# Patient Record
Sex: MALE | Race: OTHER | HISPANIC OR LATINO | URBAN - METROPOLITAN AREA
[De-identification: names, ages, dates, MRNs, and addresses within clinical notes are randomized per-mention and may not be internally consistent; named-entity substitution may affect disease eponyms.]

---

## 2020-07-02 ENCOUNTER — OFFICE VISIT (OUTPATIENT)
Dept: URBAN - METROPOLITAN AREA CLINIC 60 | Facility: CLINIC | Age: 75
End: 2020-07-02

## 2020-07-21 ENCOUNTER — OFFICE VISIT (OUTPATIENT)
Dept: URBAN - METROPOLITAN AREA CLINIC 60 | Facility: CLINIC | Age: 75
End: 2020-07-21

## 2020-08-12 ENCOUNTER — OFFICE VISIT (OUTPATIENT)
Dept: URBAN - METROPOLITAN AREA SURGERY CENTER 4 | Facility: SURGERY CENTER | Age: 75
End: 2020-08-12

## 2020-09-23 ENCOUNTER — OFFICE VISIT (OUTPATIENT)
Dept: URBAN - METROPOLITAN AREA SURGERY CENTER 4 | Facility: SURGERY CENTER | Age: 75
End: 2020-09-23

## 2020-11-16 ENCOUNTER — OFFICE VISIT (OUTPATIENT)
Dept: URBAN - METROPOLITAN AREA SURGERY CENTER 4 | Facility: SURGERY CENTER | Age: 75
End: 2020-11-16

## 2021-01-20 ENCOUNTER — OFFICE VISIT (OUTPATIENT)
Dept: URBAN - METROPOLITAN AREA SURGERY CENTER 4 | Facility: SURGERY CENTER | Age: 76
End: 2021-01-20

## 2021-06-15 ENCOUNTER — OFFICE VISIT (OUTPATIENT)
Dept: URBAN - METROPOLITAN AREA CLINIC 60 | Facility: CLINIC | Age: 76
End: 2021-06-15

## 2021-07-13 ENCOUNTER — OFFICE VISIT (OUTPATIENT)
Dept: URBAN - METROPOLITAN AREA CLINIC 60 | Facility: CLINIC | Age: 76
End: 2021-07-13

## 2021-08-17 ENCOUNTER — OFFICE VISIT (OUTPATIENT)
Dept: URBAN - METROPOLITAN AREA CLINIC 60 | Facility: CLINIC | Age: 76
End: 2021-08-17

## 2021-09-07 ENCOUNTER — OFFICE VISIT (OUTPATIENT)
Dept: URBAN - METROPOLITAN AREA CLINIC 60 | Facility: CLINIC | Age: 76
End: 2021-09-07

## 2022-07-09 ENCOUNTER — TELEPHONE ENCOUNTER (OUTPATIENT)
Dept: URBAN - METROPOLITAN AREA CLINIC 121 | Facility: CLINIC | Age: 77
End: 2022-07-09

## 2022-07-09 RX ORDER — BRIMONIDINE TARTRATE, TIMOLOL MALEATE 2; 5 MG/ML; MG/ML
SOLUTION/ DROPS OPHTHALMIC TWICE A DAY
Refills: 0 | OUTPATIENT
Start: 2014-10-28 | End: 2020-04-13

## 2022-07-09 RX ORDER — BRINZOLAMIDE 10 MG/ML
SUSPENSION/ DROPS OPHTHALMIC
Refills: 0 | OUTPATIENT
Start: 2020-06-12 | End: 2020-07-02

## 2022-07-09 RX ORDER — SOLIFENACIN SUCCINATE 5 MG/1
TABLET, FILM COATED ORAL ONCE A DAY
Refills: 0 | OUTPATIENT
Start: 2014-10-28 | End: 2020-07-02

## 2022-07-09 RX ORDER — OMEPRAZOLE 20 MG/1
CAPSULE, DELAYED RELEASE ORAL
Refills: 0 | OUTPATIENT
Start: 2020-02-06 | End: 2020-04-13

## 2022-07-09 RX ORDER — SIMVASTATIN 10 MG/1
TABLET, FILM COATED ORAL
Refills: 0 | OUTPATIENT
Start: 2020-07-02 | End: 2020-07-02

## 2022-07-09 RX ORDER — FINASTERIDE 5 MG/1
TABLET, FILM COATED ORAL ONCE A DAY
Refills: 0 | OUTPATIENT
Start: 2014-10-28 | End: 2020-07-02

## 2022-07-09 RX ORDER — BRINZOLAMIDE 10 MG/ML
SUSPENSION/ DROPS OPHTHALMIC TWICE A DAY
Refills: 0 | OUTPATIENT
Start: 2014-10-28 | End: 2020-04-13

## 2022-07-09 RX ORDER — LATANOPROST/PF 0.005 %
DROPS OPHTHALMIC (EYE)
Refills: 0 | OUTPATIENT
Start: 2020-07-02 | End: 2020-07-02

## 2022-07-09 RX ORDER — LATANOPROST/PF 0.005 %
DROPS OPHTHALMIC (EYE) TAKE AS DIRECTED
Refills: 0 | OUTPATIENT
Start: 2020-01-16 | End: 2020-04-13

## 2022-07-09 RX ORDER — LATANOPROST/PF 0.005 %
DROPS OPHTHALMIC (EYE) TAKE AS DIRECTED
Refills: 0 | OUTPATIENT
Start: 2020-04-13 | End: 2020-07-02

## 2022-07-09 RX ORDER — BRIMONIDINE TARTRATE, TIMOLOL MALEATE 2; 5 MG/ML; MG/ML
SOLUTION/ DROPS OPHTHALMIC TWICE A DAY
Refills: 0 | OUTPATIENT
Start: 2020-04-13 | End: 2020-07-02

## 2022-07-09 RX ORDER — OMEPRAZOLE 20 MG/1
CAPSULE, DELAYED RELEASE ORAL
Refills: 0 | OUTPATIENT
Start: 2020-07-02 | End: 2020-07-02

## 2022-07-09 RX ORDER — TAMSULOSIN HYDROCHLORIDE 0.4 MG/1
CAPSULE ORAL ONCE A DAY
Refills: 0 | OUTPATIENT
Start: 2014-10-28 | End: 2020-07-02

## 2022-07-09 RX ORDER — TAMSULOSIN HYDROCHLORIDE 0.4 MG/1
CAPSULE ORAL ONCE A DAY
Refills: 0 | OUTPATIENT
Start: 2020-07-02 | End: 2020-07-02

## 2022-07-09 RX ORDER — SIMVASTATIN 10 MG/1
TABLET, FILM COATED ORAL
Refills: 0 | OUTPATIENT
Start: 2020-06-01 | End: 2020-07-02

## 2022-07-09 RX ORDER — OMEPRAZOLE 20 MG/1
CAPSULE, DELAYED RELEASE ORAL
Refills: 0 | OUTPATIENT
Start: 2020-04-13 | End: 2020-07-02

## 2022-07-09 RX ORDER — LATANOPROST 0.005 %
DROPS OPHTHALMIC (EYE)
Refills: 0 | OUTPATIENT
Start: 2010-02-12 | End: 2020-04-13

## 2022-07-09 RX ORDER — SOLIFENACIN SUCCINATE 5 MG/1
TABLET, FILM COATED ORAL ONCE A DAY
Refills: 0 | OUTPATIENT
Start: 2020-07-02 | End: 2020-07-02

## 2022-07-09 RX ORDER — METRONIDAZOLE 500 MG/1
TWICE A DAY TABLET ORAL TWICE A DAY
Refills: 0 | OUTPATIENT
Start: 2015-06-05 | End: 2020-04-13

## 2022-07-09 RX ORDER — LATANOPROST/PF 0.005 %
DROPS OPHTHALMIC (EYE)
Refills: 0 | OUTPATIENT
Start: 2020-06-10 | End: 2020-07-02

## 2022-07-10 ENCOUNTER — TELEPHONE ENCOUNTER (OUTPATIENT)
Dept: URBAN - METROPOLITAN AREA CLINIC 121 | Facility: CLINIC | Age: 77
End: 2022-07-10

## 2022-07-10 RX ORDER — FINASTERIDE 5 MG/1
TABLET, FILM COATED ORAL ONCE A DAY
Refills: 0 | Status: ACTIVE | COMMUNITY
Start: 2020-07-02

## 2022-07-10 RX ORDER — LATANOPROST/PF 0.005 %
DROPS OPHTHALMIC (EYE) TAKE AS DIRECTED
Refills: 0 | Status: ACTIVE | COMMUNITY
Start: 2020-07-02

## 2022-07-10 RX ORDER — BRIMONIDINE TARTRATE, TIMOLOL MALEATE 2; 5 MG/ML; MG/ML
SOLUTION/ DROPS OPHTHALMIC TWICE A DAY
Refills: 0 | Status: ACTIVE | COMMUNITY
Start: 2020-07-02

## 2022-07-10 RX ORDER — OMEPRAZOLE 20 MG/1
CAPSULE, DELAYED RELEASE ORAL ONCE A DAY
Refills: 0 | Status: ACTIVE | COMMUNITY
Start: 2020-07-02

## 2022-07-10 RX ORDER — SIMVASTATIN 10 MG/1
TABLET, FILM COATED ORAL ONCE A DAY
Refills: 0 | Status: ACTIVE | COMMUNITY
Start: 2020-07-02

## 2022-07-10 RX ORDER — BRINZOLAMIDE 10 MG/ML
SUSPENSION/ DROPS OPHTHALMIC
Refills: 0 | Status: ACTIVE | COMMUNITY
Start: 2020-07-02

## 2022-07-30 ENCOUNTER — TELEPHONE ENCOUNTER (OUTPATIENT)
Age: 77
End: 2022-07-30

## 2022-07-31 ENCOUNTER — TELEPHONE ENCOUNTER (OUTPATIENT)
Age: 77
End: 2022-07-31

## 2022-09-13 ENCOUNTER — OFFICE VISIT (OUTPATIENT)
Dept: URBAN - METROPOLITAN AREA CLINIC 60 | Facility: CLINIC | Age: 77
End: 2022-09-13

## 2022-09-13 RX ORDER — BRINZOLAMIDE 10 MG/ML
SUSPENSION/ DROPS OPHTHALMIC
Refills: 0 | COMMUNITY
Start: 2020-07-02

## 2022-09-13 RX ORDER — SIMVASTATIN 10 MG/1
TABLET, FILM COATED ORAL ONCE A DAY
Refills: 0 | COMMUNITY
Start: 2020-07-02

## 2022-09-13 RX ORDER — OMEPRAZOLE 20 MG/1
CAPSULE, DELAYED RELEASE ORAL ONCE A DAY
Refills: 0 | COMMUNITY
Start: 2020-07-02

## 2022-09-13 RX ORDER — BRIMONIDINE TARTRATE, TIMOLOL MALEATE 2; 5 MG/ML; MG/ML
SOLUTION/ DROPS OPHTHALMIC TWICE A DAY
Refills: 0 | COMMUNITY
Start: 2020-07-02

## 2022-09-13 RX ORDER — LATANOPROST/PF 0.005 %
DROPS OPHTHALMIC (EYE) TAKE AS DIRECTED
Refills: 0 | COMMUNITY
Start: 2020-07-02

## 2022-09-13 RX ORDER — FINASTERIDE 5 MG/1
TABLET, FILM COATED ORAL ONCE A DAY
Refills: 0 | COMMUNITY
Start: 2020-07-02

## 2022-10-25 ENCOUNTER — OFFICE VISIT (OUTPATIENT)
Dept: URBAN - METROPOLITAN AREA CLINIC 60 | Facility: CLINIC | Age: 77
End: 2022-10-25

## 2023-11-07 ENCOUNTER — OFFICE VISIT (OUTPATIENT)
Dept: URBAN - METROPOLITAN AREA CLINIC 60 | Facility: CLINIC | Age: 78
End: 2023-11-07

## 2023-11-07 PROBLEM — 305058001: Status: ACTIVE | Noted: 2023-11-07

## 2023-11-07 PROBLEM — 266435005: Status: ACTIVE | Noted: 2023-11-07

## 2023-11-07 RX ORDER — BRINZOLAMIDE 10 MG/ML
SUSPENSION/ DROPS OPHTHALMIC
Refills: 0 | COMMUNITY
Start: 2020-07-02

## 2023-11-07 RX ORDER — LATANOPROST/PF 0.005 %
DROPS OPHTHALMIC (EYE) TAKE AS DIRECTED
Refills: 0 | COMMUNITY
Start: 2020-07-02

## 2023-11-07 RX ORDER — FINASTERIDE 5 MG/1
TABLET, FILM COATED ORAL ONCE A DAY
Refills: 0 | COMMUNITY
Start: 2020-07-02

## 2023-11-07 RX ORDER — SIMVASTATIN 10 MG/1
TABLET, FILM COATED ORAL ONCE A DAY
Refills: 0 | COMMUNITY
Start: 2020-07-02

## 2023-11-07 RX ORDER — OMEPRAZOLE 20 MG/1
CAPSULE, DELAYED RELEASE ORAL ONCE A DAY
Refills: 0 | COMMUNITY
Start: 2020-07-02

## 2023-11-07 RX ORDER — BRIMONIDINE TARTRATE, TIMOLOL MALEATE 2; 5 MG/ML; MG/ML
SOLUTION/ DROPS OPHTHALMIC TWICE A DAY
Refills: 0 | COMMUNITY
Start: 2020-07-02

## 2023-11-07 NOTE — HPI-TODAY'S VISIT:
Garrett is a pleasant 78-year-old male who was last seen in July 2020 for symptoms of gas and bloating and was set up to undergo an upper endoscopy and colonoscopy. Procedures were canceled at the time due to COVID. He was lost to follow-up. Previous history of H. pylori gastritis.

## 2023-11-07 NOTE — HPI-PREVIOUS LABS
Labs August 2023: WBC 5.5, hemoglobin 14.4 MCV 89, platelets 225 BUN 21 creatinine 0.97, normal electrolytes, serum albumin 4.1 normal liver enzymes, UA proteinuria and trace occult blood.

## 2024-02-06 ENCOUNTER — OV EP (OUTPATIENT)
Dept: URBAN - METROPOLITAN AREA CLINIC 60 | Facility: CLINIC | Age: 79
End: 2024-02-06
Payer: COMMERCIAL

## 2024-02-06 VITALS
HEART RATE: 61 BPM | TEMPERATURE: 97.9 F | OXYGEN SATURATION: 96 % | HEIGHT: 65 IN | DIASTOLIC BLOOD PRESSURE: 70 MMHG | RESPIRATION RATE: 12 BRPM | BODY MASS INDEX: 20.29 KG/M2 | WEIGHT: 121.8 LBS | SYSTOLIC BLOOD PRESSURE: 124 MMHG

## 2024-02-06 DIAGNOSIS — Z12.11 SCREEN FOR COLON CANCER: ICD-10-CM

## 2024-02-06 DIAGNOSIS — K21.9 GASTROESOPHAGEAL REFLUX DISEASE WITHOUT ESOPHAGITIS: ICD-10-CM

## 2024-02-06 PROCEDURE — 99203 OFFICE O/P NEW LOW 30 MIN: CPT | Performed by: INTERNAL MEDICINE

## 2024-02-06 RX ORDER — FINASTERIDE 5 MG/1
TABLET, FILM COATED ORAL ONCE A DAY
Refills: 0 | Status: ACTIVE | COMMUNITY
Start: 2020-07-02

## 2024-02-06 RX ORDER — OMEPRAZOLE 20 MG/1
CAPSULE, DELAYED RELEASE ORAL ONCE A DAY
Refills: 0 | Status: ACTIVE | COMMUNITY
Start: 2020-07-02

## 2024-02-06 RX ORDER — LATANOPROST/PF 0.005 %
DROPS OPHTHALMIC (EYE) TAKE AS DIRECTED
Refills: 0 | Status: ACTIVE | COMMUNITY
Start: 2020-07-02

## 2024-02-06 RX ORDER — BRIMONIDINE TARTRATE, TIMOLOL MALEATE 2; 5 MG/ML; MG/ML
SOLUTION/ DROPS OPHTHALMIC TWICE A DAY
Refills: 0 | Status: ACTIVE | COMMUNITY
Start: 2020-07-02

## 2024-02-06 NOTE — HPI-PREVIOUS LABS
Labs August 2023: WBC 5.5, hemoglobin 14.4 MCV 89, platelets 225 BUN 21 creatinine 0.97, normal electrolytes, serum albumin 4.1 normal liver enzymes, UA proteinuria and trace occult blood. Labs August 2023: WBC 5.5, hemoglobin 14.4 MCV 89, platelets 225 BUN 21 creatinine 0.97, normal electrolytes, serum albumin 4.1 normal liver enzymes, UA proteinuria and trace occult blood.

## 2024-02-06 NOTE — HPI-TODAY'S VISIT:
Garrett is a pleasant 78-year-old male who was last seen in July 2020 for symptoms of gas and bloating and was set up to undergo an upper endoscopy and colonoscopy. Procedures were canceled at the time due to COVID. He was lost to follow-up.  Here to schedule a screening colonoscopy. I reviewed his history. He had an upper endoscopy and a colonoscopy done by me in 2010 which was unremarkable. No H. pylori, no Montoya's, small hiatal hernia, no polyps but was noted to have diverticular disease Subsequently underwent upper endoscopy in 2015 done by me and an upper endoscopy and colonoscopy in 2017 done by Dr. Wayne Lau-all unremarkable Garrett reports mild symptoms of bloating and flatulence and admits that he has been drinking milk and eating ice cream on and off. He has now switched to soy milk. He denies any dysphagia, early satiety, loss of appetite, unintentional weight loss, rectal bleeding or recent change in bowel habits. He appears weak and frail and in my opinion may not tolerate a colonoscopy prep. The risks of colonoscopy endoscopy and sedation outweigh the benefits  Previous history of H. pylori gastritis.

## 2024-02-06 NOTE — HPI-PREVIOUS IMAGING
CT abdomen and pelvis November 2014 was unremarkable except for mild prostate enlargement CT abdomen and pelvis November 2014 was unremarkable except for mild prostate enlargement

## 2024-02-06 NOTE — HPI-PREVIOUS PROCEDURES
Colonoscopy 2017-Dr. Lau: Adequate bowel prep, sigmoid diverticulosis, grade 1 hemorrhoids-no polyps-recall in 10 years Upper endoscopy 2017: Irregular Z-line-biopsied-unremarkable biopsies, small hiatal hernia, mild erythematous gastritis-biopsied to be H. pylori negative  Upper endoscopy 2015: Mild gastritis, hiatal hernia, multiple duodenal diverticula  Upper endoscopy 2010: Mild H. pylori negative gastritis and a 3 cm hiatal hernia and multiple duodenal diverticula with unremarkable duodenal biopsies Colonoscopy 2010: Pancolonic mild diverticular disease and lipomatous appearing IC valve with unremarkable biopsies Colonoscopy 2017-Dr. Lau: Adequate bowel prep, sigmoid diverticulosis, grade 1 hemorrhoids-no polyps-recall in 10 years Upper endoscopy 2017: Irregular Z-line-biopsied-unremarkable biopsies, small hiatal hernia, mild erythematous gastritis-biopsied to be H. pylori negative  Upper endoscopy 2015: Mild gastritis, hiatal hernia, multiple duodenal diverticula  Upper endoscopy 2010: Mild H. pylori negative gastritis and a 3 cm hiatal hernia and multiple duodenal diverticula with unremarkable duodenal biopsies Colonoscopy 2010: Pancolonic mild diverticular disease and lipomatous appearing IC valve with unremarkable biopsies
